# Patient Record
Sex: FEMALE | Race: WHITE
[De-identification: names, ages, dates, MRNs, and addresses within clinical notes are randomized per-mention and may not be internally consistent; named-entity substitution may affect disease eponyms.]

---

## 2017-06-06 ENCOUNTER — HOSPITAL ENCOUNTER (EMERGENCY)
Dept: HOSPITAL 63 - ER | Age: 27
Discharge: ANOTHER HOSPITAL | End: 2017-06-06
Payer: SELF-PAY

## 2017-06-06 ENCOUNTER — HOSPITAL ENCOUNTER (INPATIENT)
Dept: HOSPITAL 61 - 4 NORTH | Age: 27
LOS: 3 days | Discharge: HOME | DRG: 418 | End: 2017-06-09
Attending: INTERNAL MEDICINE | Admitting: INTERNAL MEDICINE
Payer: SELF-PAY

## 2017-06-06 VITALS — SYSTOLIC BLOOD PRESSURE: 121 MMHG | DIASTOLIC BLOOD PRESSURE: 83 MMHG

## 2017-06-06 VITALS — SYSTOLIC BLOOD PRESSURE: 119 MMHG | DIASTOLIC BLOOD PRESSURE: 82 MMHG

## 2017-06-06 VITALS — SYSTOLIC BLOOD PRESSURE: 121 MMHG | DIASTOLIC BLOOD PRESSURE: 71 MMHG

## 2017-06-06 VITALS — BODY MASS INDEX: 40.55 KG/M2 | HEIGHT: 67 IN | WEIGHT: 258.38 LBS

## 2017-06-06 VITALS — DIASTOLIC BLOOD PRESSURE: 81 MMHG | SYSTOLIC BLOOD PRESSURE: 123 MMHG

## 2017-06-06 VITALS — SYSTOLIC BLOOD PRESSURE: 126 MMHG | DIASTOLIC BLOOD PRESSURE: 80 MMHG

## 2017-06-06 VITALS — WEIGHT: 274 LBS | HEIGHT: 66 IN | BODY MASS INDEX: 44.03 KG/M2

## 2017-06-06 VITALS — SYSTOLIC BLOOD PRESSURE: 130 MMHG | DIASTOLIC BLOOD PRESSURE: 84 MMHG

## 2017-06-06 VITALS — DIASTOLIC BLOOD PRESSURE: 68 MMHG | SYSTOLIC BLOOD PRESSURE: 112 MMHG

## 2017-06-06 VITALS — DIASTOLIC BLOOD PRESSURE: 73 MMHG | SYSTOLIC BLOOD PRESSURE: 110 MMHG

## 2017-06-06 VITALS — DIASTOLIC BLOOD PRESSURE: 50 MMHG | SYSTOLIC BLOOD PRESSURE: 123 MMHG

## 2017-06-06 DIAGNOSIS — Z83.3: ICD-10-CM

## 2017-06-06 DIAGNOSIS — K76.0: ICD-10-CM

## 2017-06-06 DIAGNOSIS — E66.01: ICD-10-CM

## 2017-06-06 DIAGNOSIS — K80.00: Primary | ICD-10-CM

## 2017-06-06 DIAGNOSIS — K80.62: Primary | ICD-10-CM

## 2017-06-06 DIAGNOSIS — Z87.891: ICD-10-CM

## 2017-06-06 DIAGNOSIS — Z90.89: ICD-10-CM

## 2017-06-06 DIAGNOSIS — Z82.49: ICD-10-CM

## 2017-06-06 LAB
ALBUMIN SERPL-MCNC: 3.5 G/DL (ref 3.4–5)
ALBUMIN SERPL-MCNC: 3.7 G/DL (ref 3.4–5)
ALBUMIN/GLOB SERPL: 0.9 {RATIO} (ref 1–1.7)
ALBUMIN/GLOB SERPL: 0.9 {RATIO} (ref 1–1.7)
ALP SERPL-CCNC: 63 U/L (ref 46–116)
ALP SERPL-CCNC: 79 U/L (ref 46–116)
ALT SERPL-CCNC: 26 U/L (ref 14–59)
ALT SERPL-CCNC: 31 U/L (ref 14–59)
ANION GAP SERPL CALC-SCNC: 11 MMOL/L (ref 6–14)
ANION GAP SERPL CALC-SCNC: 13 MMOL/L (ref 6–14)
APTT PPP: YELLOW S
AST SERPL-CCNC: 16 U/L (ref 15–37)
AST SERPL-CCNC: 33 U/L (ref 15–37)
BACTERIA #/AREA URNS HPF: (no result) /HPF
BASOPHILS # BLD AUTO: 0 X10^3/UL (ref 0–0.2)
BASOPHILS # BLD AUTO: 0.1 X10^3/UL (ref 0–0.2)
BASOPHILS NFR BLD: 0 % (ref 0–3)
BASOPHILS NFR BLD: 0 % (ref 0–3)
BILIRUB SERPL-MCNC: 0.3 MG/DL (ref 0.2–1)
BILIRUB SERPL-MCNC: 0.4 MG/DL (ref 0.2–1)
BILIRUB UR QL STRIP: (no result)
BUN SERPL-MCNC: 9 MG/DL (ref 7–20)
BUN/CREAT SERPL: 13 (ref 6–20)
BUN/CREAT SERPL: 15 (ref 6–20)
CA-I SERPL ISE-MCNC: 12 MG/DL (ref 7–20)
CALCIUM SERPL-MCNC: 8.7 MG/DL (ref 8.5–10.1)
CALCIUM SERPL-MCNC: 9.1 MG/DL (ref 8.5–10.1)
CHLORIDE SERPL-SCNC: 104 MMOL/L (ref 98–107)
CHLORIDE SERPL-SCNC: 104 MMOL/L (ref 98–107)
CO2 SERPL-SCNC: 24 MMOL/L (ref 21–32)
CO2 SERPL-SCNC: 26 MMOL/L (ref 21–32)
CREAT SERPL-MCNC: 0.7 MG/DL (ref 0.6–1)
CREAT SERPL-MCNC: 0.8 MG/DL (ref 0.6–1)
EOSINOPHIL NFR BLD: 0 % (ref 0–3)
EOSINOPHIL NFR BLD: 0.1 X10^3/UL (ref 0–0.7)
EOSINOPHIL NFR BLD: 1 % (ref 0–3)
ERYTHROCYTE [DISTWIDTH] IN BLOOD BY AUTOMATED COUNT: 12.7 % (ref 11.5–14.5)
ERYTHROCYTE [DISTWIDTH] IN BLOOD BY AUTOMATED COUNT: 12.8 % (ref 11.5–14.5)
FIBRINOGEN PPP-MCNC: CLEAR MG/DL
GFR SERPLBLD BASED ON 1.73 SQ M-ARVRAT: 102.8 ML/MIN
GFR SERPLBLD BASED ON 1.73 SQ M-ARVRAT: 88.1 ML/MIN
GLOBULIN SER-MCNC: 3.8 G/DL (ref 2.2–3.8)
GLOBULIN SER-MCNC: 3.9 G/DL (ref 2.2–3.8)
GLUCOSE SERPL-MCNC: 117 MG/DL (ref 70–99)
GLUCOSE SERPL-MCNC: 121 MG/DL (ref 70–99)
GLUCOSE UR STRIP-MCNC: (no result) MG/DL
HCT VFR BLD CALC: 37.1 % (ref 36–47)
HCT VFR BLD CALC: 40 % (ref 36–47)
HGB BLD-MCNC: 12.7 G/DL (ref 12–15.5)
HGB BLD-MCNC: 13.5 G/DL (ref 12–15.5)
LIPASE: 83 U/L (ref 73–393)
LYMPHOCYTES # BLD: 1.2 X10^3/UL (ref 1–4.8)
LYMPHOCYTES # BLD: 2.3 X10^3/UL (ref 1–4.8)
LYMPHOCYTES NFR BLD AUTO: 10 % (ref 24–48)
LYMPHOCYTES NFR BLD AUTO: 18 % (ref 24–48)
MCH RBC QN AUTO: 29 PG (ref 25–35)
MCH RBC QN AUTO: 29 PG (ref 25–35)
MCHC RBC AUTO-ENTMCNC: 34 G/DL (ref 31–37)
MCHC RBC AUTO-ENTMCNC: 34 G/DL (ref 31–37)
MCV RBC AUTO: 84 FL (ref 79–100)
MCV RBC AUTO: 85 FL (ref 79–100)
MONO #: 1 X10^3/UL (ref 0–1.1)
MONOCYTES NFR BLD: 6 % (ref 0–9)
MONOCYTES NFR BLD: 8 % (ref 0–9)
NEUT #: 9.1 X10^3UL (ref 1.8–7.7)
NEUTROPHILS NFR BLD AUTO: 73 % (ref 31–73)
NEUTROPHILS NFR BLD AUTO: 84 % (ref 31–73)
NITRITE UR QL STRIP: (no result)
PLATELET # BLD AUTO: 163 X10^3/UL (ref 140–400)
PLATELET # BLD AUTO: 173 X10^3/UL (ref 140–400)
POTASSIUM SERPL-SCNC: 3.9 MMOL/L (ref 3.5–5.1)
POTASSIUM SERPL-SCNC: 3.9 MMOL/L (ref 3.5–5.1)
PROT SERPL-MCNC: 7.3 G/DL (ref 6.4–8.2)
PROT SERPL-MCNC: 7.6 G/DL (ref 6.4–8.2)
RBC # BLD AUTO: 4.41 X10^6/UL (ref 3.5–5.4)
RBC # BLD AUTO: 4.71 X10^6/UL (ref 3.5–5.4)
RBC #/AREA URNS HPF: (no result) /HPF (ref 0–2)
SODIUM SERPL-SCNC: 141 MMOL/L (ref 136–145)
SODIUM SERPL-SCNC: 141 MMOL/L (ref 136–145)
SP GR UR STRIP: 1.02
SQUAMOUS #/AREA URNS LPF: (no result) /LPF
UROBILINOGEN UR-MCNC: 0.2 MG/DL
WBC # BLD AUTO: 11.9 X10^3/UL (ref 4–11)
WBC # BLD AUTO: 12.5 X10^3/UL (ref 4–11)
WBC #/AREA URNS HPF: (no result) /HPF (ref 0–4)

## 2017-06-06 PROCEDURE — S0028 INJECTION, FAMOTIDINE, 20 MG: HCPCS

## 2017-06-06 PROCEDURE — 83690 ASSAY OF LIPASE: CPT

## 2017-06-06 PROCEDURE — 96372 THER/PROPH/DIAG INJ SC/IM: CPT

## 2017-06-06 PROCEDURE — 74300 X-RAY BILE DUCTS/PANCREAS: CPT

## 2017-06-06 PROCEDURE — 96361 HYDRATE IV INFUSION ADD-ON: CPT

## 2017-06-06 PROCEDURE — 96375 TX/PRO/DX INJ NEW DRUG ADDON: CPT

## 2017-06-06 PROCEDURE — 96376 TX/PRO/DX INJ SAME DRUG ADON: CPT

## 2017-06-06 PROCEDURE — C1726 CATH, BAL DIL, NON-VASCULAR: HCPCS

## 2017-06-06 PROCEDURE — 76705 ECHO EXAM OF ABDOMEN: CPT

## 2017-06-06 PROCEDURE — 80076 HEPATIC FUNCTION PANEL: CPT

## 2017-06-06 PROCEDURE — 85610 PROTHROMBIN TIME: CPT

## 2017-06-06 PROCEDURE — 74328 X-RAY BILE DUCT ENDOSCOPY: CPT

## 2017-06-06 PROCEDURE — C1769 GUIDE WIRE: HCPCS

## 2017-06-06 PROCEDURE — 85027 COMPLETE CBC AUTOMATED: CPT

## 2017-06-06 PROCEDURE — 81001 URINALYSIS AUTO W/SCOPE: CPT

## 2017-06-06 PROCEDURE — 93005 ELECTROCARDIOGRAM TRACING: CPT

## 2017-06-06 PROCEDURE — 36415 COLL VENOUS BLD VENIPUNCTURE: CPT

## 2017-06-06 PROCEDURE — 80053 COMPREHEN METABOLIC PANEL: CPT

## 2017-06-06 PROCEDURE — 81025 URINE PREGNANCY TEST: CPT

## 2017-06-06 PROCEDURE — C1782 MORCELLATOR: HCPCS

## 2017-06-06 PROCEDURE — 96365 THER/PROPH/DIAG IV INF INIT: CPT

## 2017-06-06 PROCEDURE — 99285 EMERGENCY DEPT VISIT HI MDM: CPT

## 2017-06-06 PROCEDURE — 0FT44ZZ RESECTION OF GALLBLADDER, PERCUTANEOUS ENDOSCOPIC APPROACH: ICD-10-PCS | Performed by: SURGERY

## 2017-06-06 PROCEDURE — BF131ZZ FLUOROSCOPY OF GALLBLADDER AND BILE DUCTS USING LOW OSMOLAR CONTRAST: ICD-10-PCS | Performed by: SURGERY

## 2017-06-06 RX ADMIN — BACITRACIN SCH MLS/HR: 5000 INJECTION, POWDER, FOR SOLUTION INTRAMUSCULAR at 09:17

## 2017-06-06 RX ADMIN — BACITRACIN SCH MLS/HR: 5000 INJECTION, POWDER, FOR SOLUTION INTRAMUSCULAR at 18:20

## 2017-06-06 RX ADMIN — MORPHINE SULFATE PRN MG: 2 INJECTION, SOLUTION INTRAMUSCULAR; INTRAVENOUS at 10:54

## 2017-06-06 RX ADMIN — MORPHINE SULFATE PRN MG: 2 INJECTION, SOLUTION INTRAMUSCULAR; INTRAVENOUS at 09:16

## 2017-06-06 RX ADMIN — MORPHINE SULFATE PRN MG: 2 INJECTION, SOLUTION INTRAMUSCULAR; INTRAVENOUS at 16:14

## 2017-06-06 RX ADMIN — OXYCODONE HYDROCHLORIDE AND ACETAMINOPHEN PRN TAB: 5; 325 TABLET ORAL at 19:43

## 2017-06-06 NOTE — PDOC2
LENORE CONLEY APRN 6/6/17 0852:


CONSULT


Date of Consult


Date of Consult


DATE: 6/6/17 


TIME: 08:46





Reason for Consult


Reason for Consult:


cholelithiasis





Referring Physician


Referring Physician:


[facility] ER





Identification/Chief Complaint


Chief Complaint


abdominal pain





Source


Source:  Chart review, Patient





History of Present Illness


Reason for Visit:


Admitted wtih epigastric and RUQ pain for 3 days.  Last night developed 

vomiting.  Denies aggravated by eating, only relieving factor is pain 

medication.  Denies similar pain in past.  Pain is constant and cramping in 

nature 


No constipation or diarrhea, denies NSAID use





Past Medical History


Past Medical History


denies any pertinent hx





Past Surgical History


Past Surgical History:  Tonsillectomy





Family History


Family History:  Other (noncontributory to current illness )





Social History


Social History


stay at home mom


No


ALCOHOL:  none


Drugs:  None


Lives:  with Family





Current Medications


Current Medications





Current Medications


Sodium Chloride 1,000 ml @  100 mls/hr Q10H IV ;  Start 6/6/17 at 08:45


Morphine Sulfate 2 mg PRN Q2HR  PRN IV PAIN;  Start 6/6/17 at 08:45


Ondansetron HCl (Zofran) 4 mg PRN Q6HRS  PRN IV NAUSEA/VOMITING;  Start 6/6/17 

at 08:45





Allergies


Allergies:  


Coded Allergies:  


     No Known Drug Allergies (Unverified , 6/6/17)





ROS


General:  No: Chills, Other (fevers )


PSYCHOLOGICAL ROS:  No: Anxiety, Depression


Eyes:  No Blurry vision, No Double vision


HEENT:  No: Heacaches, Sore Throat


Hematological and Lymphatic:  No: Bleeding Problems, Blood Clots


Respiratory:  No: Cough, SOB with excertion


Cardiovascular:  No Chest Pain, No Palpitations


Gastrointestinal:  Yes Other (see hpi)


Genitourinary:  No Dysuria, No Hematuria


Musculoskeletal:  Yes Other (+ radiation of pain to back ), 


   No Joint Pain, No Muscle Pain


Neurological:  No Confusion, No Numbness/Tingling


Skin:  No Mottling, No Pruritus





Physical Exam


General:  Alert, Oriented X3, Cooperative, No acute distress


HEENT:  PERRLA, Mucous membr. moist/pink


Lungs:  Clear to auscultation, Normal air movement


Heart:  Regular rate, Normal S1, Normal S2, No murmurs


Abdomen:  Soft, Other (ND, obese abdomen, greatest tenderness to epigastric, 

however also tender to RUQ, LUQ)


Extremities:  No clubbing, No cyanosis


Skin:  No breakdown, No significant lesion


Neuro:  Normal speech, Sensation intact


Psych/Mental Status:  Mental status NL, Mood NL


MUSCULOSKELETAL:  No deformity, No swelling





Assessment/Plan


Assessment/Plan


US reviewed from [facility]--symptomatic cholelithiasis with nonmobile stone in neck 

of GB, dilated CBD and thickened gallbladder wall


morbid obesity with BMI 40.5





will plan lap ritu


NPO since 4 am





ELVIS PORTILLO MD 6/6/17 1211:


CONSULT


Allergies


Allergies:  


Coded Allergies:  


     No Known Drug Allergies (Unverified , 6/6/17)





Assessment/Plan


Assessment/Plan


Pt seen and examined independently by myself;  26 year old female who reported 

to ER with acute onset of RUQ pain, vomiting.  Pt has never had prior attacks.  

ER evaluation included a sonogram consistent with cholecystitis.  PMH/PSH/ROS/

SH as above;  exam: alert, oriented, drowsy from pain meds,  no scleral icterus

, neck supple, heart RR and R, lungs clear, abdomen morbidly obese, soft,  

tender RUQ with guarding,  no masses, ext neg for edema or deformity,  neuro 

motor function grossly intact all 4 extremities.  Labs and sono reviewed;  A/P)

  Calculous cholecystitis,  ?bile duct dilation,  recommend lap ritu with 

cholangiogram.  I discussed the details and risks of surgery with the patient.  

She understands and would like to proceed.











LENORE CONLEY Jun 6, 2017 08:52


ELVIS PORTILLO MD Jun 6, 2017 12:11

## 2017-06-06 NOTE — ACF
Admission Criteria Forms


GALLBLADDER OR BILE DUCT INFLAMMATION OR STONE


 


Clinical Indications for Admission to Inpatient Care


                                                                              (

Place 'X' for any and all applicable criteria):


 


Admission is indicated for patients with ANY ONE of the following(1)(2)(3)(4)(5)

: 


[]I.     Acute cholecystitis as indicated by ALL of the following: 


          [ ]a)   Right upper quadrant pain, mass, or tenderness


          [ ]b)   Systemic signs of inflammation indicated by ANY ONE of the 

following:


                   [ ]i)    Fever


                   [ ]ii)   C-reactive protein level greater than 10 mg/L (95 

nmol/L)


                   [ ]iii)  White blood cell count greater than 10,000/mm3 (10 

x109/L) or less than 4000/mm3 (4 x109/L)


[ ]II.    Inpatient admission required rather than observation care (Also use 

Gallbladder or Bile Duct Inflammation or Stone: 


         Observation Care as appropriate) because of  ANY ONE  of the following:


          [ ]a)   Common bile duct obstruction diagnosed    


          [ ]b)   Vomiting that is severe or persistent            


          [ ]c)   Severe pain requiring acute inpatient management 


          [ ]d)   Signs of intestinal obstruction or peritonitis [A]


          [ ]e)   Severe electrolyte abnormalities requiring inpatient care 


          [ ]f)    Absent bowel sounds with complete ileus(8)


          [ ]g)   Hemodynamic instability


          [ ]h)   High fever or infection requiring inpatient admission as 

indicated by ANY ONE of the following (9): 


                  [ ]1)   Appropriate outpatient or observation care 

antimicrobial Treatment. unavailable, not effective, or not feasible       


                  [ ]2)   Temperature greater than 104.9 degrees F (40.5 

degrees C) (oral) 


                  [ ]3)   Temperature greater than 103.1 degrees F (39.5 

degrees C) (oral) or less than 96.8 degrees F (36 degrees C) 


                           (rectal) that does not respond to all emergency 

treatment measures


                  [ ]4)   Documented bacteremia 


          [ ]i)   IV fluid to replace significant ongoing losses (greater than 

3 L/m2 per day) 


          [ ]j)   Percutaneous or open drainage (eg, abscess, biliary tract) 

procedures  


          [ ]k)  Immediate inpatient surgery 


          [ ]l)   Other condition, treatment or monitoring requiring inpatient 

admission


[ ]III.  Acute cholangitis as indicated by ALL of the following(9)(10): 


          [ ]a) Systemic signs of inflammation indicated by ANY ONE of the 

following:


                 [ ]i)    Fever


                 [ ]ii)   C-reactive protein level greater than 10 mg/L (95 nmol

/L)


                 [ ]iii)  White blood cell count greater than 10,000/mm3 (10 

x109/L) or less than 4000/mm3 (4 x109/L)


          [ ]b) Evidence of common bile duct disease indicated by ANY ONE of 

the following:


                 [ ]i)   Total serum bilirubin level greater than or equal to 2 

mg/dL (34 micromoles/L)


                 [ ]ii)   Liver function test (alkaline phosphatase (ALP), r-

glutamyltransferase (GGT), aspartate aminotransferase (AST), 


                         or alanine aminotransferase (ALT)) greater than 1.5 

times the upper limit of normal[B]


                 [ ]iii)  Hepatobiliary imaging showing biliary dilatation or 

evidence of etiology (eg, stricture, stone, previously placed stent)











Extended stay beyond goal length of stay may be needed for (1)(2)): 


[ ]a)   Bacteremia or Hemodynamic instability


[ ]b)   Cholecystectomy 


[ ]c)   Other surgical procedure(24)


[ ]d)   Percutaneous or endoscopic ultrasound-guided cholecystostomy


 


                                             


The original [facility] [employer] content created by [facility] 

[employer] has been revised. 


The portions of the content which have been revised are identified through the 

use of italic text or in bold, and  [facility] 


has neither reviewed nor approved the modified material. All other unmodified 

content is copyright  [facility].





Please see references footnoted in the original [facility] edition 

2016














ALLYN MORALES Jun 6, 2017 05:55

## 2017-06-06 NOTE — PDOC2
GI CONSULT


Reason For Consult:


Small CBD filling defect





HPI:


HPI:


25 y/o female who underwent cholecystectomy this afternoon w/ Dr. Boyle for 

cholelithiasis and cholecystitis.  IOC showed possible small distal common bile 

duct calculus, hence GI consult.  She is seen in her regular room just back 

from PACU, still drowsy.  She has some post-op type discomfort that is 

different from the epigastric and BUQ pain w/ radiation to her back that 

originally brought her to [facility] ER.  Had this pain intermittently x 2 weeks, 

associated w/ n/v.  Denies reflux/heartburn, diarrhea, constipation.





Abd US at [facility] showed cholelithiasis and dilated CBD at 1.2cm w/ thickened GB 

and fatty liver.  LFTs have been normal.





PMH:


PMH:


cholecystectomy, tonsillectomy





FH:


Family History:  No pertinent hx (no GI cancers)





Social History:


Smoke:  Quit


ALCOHOL:  none


Drugs:  None





ROS:





GEN: Denies fevers, chills, sweats


HEENT: Denies blurred vision, sore throat


CV: Denies chest pain


RESP: Denies shortness of air, cough


GI: Per HPI


: Denies hematuria, dysuria


ENDO: Denies weight changes


NEURO: Denies confusion, dizziness


MSK: Denies weakness, joint pain/swelling


SKIN: Denies jaundice, pruritus





Vitals:


Vitals:





 Vital Signs








  Date Time  Temp Pulse Resp B/P (MAP) Pulse Ox O2 Delivery O2 Flow Rate FiO2


 


6/6/17 14:57 98.4 72 17 134/73 94 Room Air  





 98.4       


 


6/6/17 14:27       8 











Labs:


Labs:





Laboratory Tests








Test


  6/6/17


09:10


 


White Blood Count


  11.9 x10^3/uL


(4.0-11.0)


 


Red Blood Count


  4.41 x10^6/uL


(3.50-5.40)


 


Hemoglobin


  12.7 g/dL


(12.0-15.5)


 


Hematocrit


  37.1 %


(36.0-47.0)


 


Mean Corpuscular Volume 84 fL () 


 


Mean Corpuscular Hemoglobin 29 pg (25-35) 


 


Mean Corpuscular Hemoglobin


Concent 34 g/dL


(31-37)


 


Red Cell Distribution Width


  12.7 %


(11.5-14.5)


 


Platelet Count


  163 x10^3/uL


(140-400)


 


Neutrophils (%) (Auto) 84 % (31-73) 


 


Lymphocytes (%) (Auto) 10 % (24-48) 


 


Monocytes (%) (Auto) 6 % (0-9) 


 


Eosinophils (%) (Auto) 0 % (0-3) 


 


Basophils (%) (Auto) 0 % (0-3) 


 


Neutrophils # (Auto)


  10.0 x10^3uL


(1.8-7.7)


 


Lymphocytes # (Auto)


  1.2 x10^3/uL


(1.0-4.8)


 


Monocytes # (Auto)


  0.7 x10^3/uL


(0.0-1.1)


 


Eosinophils # (Auto)


  0.0 x10^3/uL


(0.0-0.7)


 


Basophils # (Auto)


  0.0 x10^3/uL


(0.0-0.2)


 


Sodium Level


  141 mmol/L


(136-145)


 


Potassium Level


  3.9 mmol/L


(3.5-5.1)


 


Chloride Level


  104 mmol/L


()


 


Carbon Dioxide Level


  24 mmol/L


(21-32)


 


Anion Gap 13 (6-14) 


 


Blood Urea Nitrogen 9 mg/dL (7-20) 


 


Creatinine


  0.7 mg/dL


(0.6-1.0)


 


Estimated GFR


(Cockcroft-Gault) 102.8 


 


 


BUN/Creatinine Ratio 13 (6-20) 


 


Glucose Level


  121 mg/dL


(70-99)


 


Calcium Level


  8.7 mg/dL


(8.5-10.1)


 


Total Bilirubin


  0.4 mg/dL


(0.2-1.0)


 


Aspartate Amino Transf


(AST/SGOT) 33 U/L (15-37) 


 


 


Alanine Aminotransferase


(ALT/SGPT) 31 U/L (14-59) 


 


 


Alkaline Phosphatase


  63 U/L


()


 


Total Protein


  7.3 g/dL


(6.4-8.2)


 


Albumin


  3.5 g/dL


(3.4-5.0)


 


Albumin/Globulin Ratio 0.9 (1.0-1.7) 











Allergies:


Coded Allergies:  


     No Known Drug Allergies (Unverified , 6/6/17)





Medications:





Current Medications








 Medications


  (Trade)  Dose


 Ordered  Sig/Vibha


 Route


 PRN Reason  Start Time


 Stop Time Status Last Admin


Dose Admin


 


 Sodium Chloride  1,000 ml @ 


 100 mls/hr  Q10H


 IV


   6/6/17 08:45


    6/6/17 09:17


 


 


 Morphine Sulfate  2 mg  PRN Q2HR  PRN


 IV


 PAIN  6/6/17 08:45


    6/6/17 10:54


 


 


 Cefazolin Sodium/


 Dextrose  50 ml @ 


 100 mls/hr  1X PREOP  PRN


 IV


 on call to OR   6/6/17 11:15


    6/6/17 12:40


 


 


 Morphine Sulfate  1 mg  PRN Q10MIN  PRN


 IV


 SEVERE PAIN  6/6/17 12:00


 6/7/17 11:59  6/6/17 14:41


 


 


 Ringer's Solution  1,000 ml @ 


 0 mls/hr  Q0M


 IV


   6/6/17 11:57


 6/6/17 23:56  6/6/17 12:26


 


 


 Bupivacaine HCl/


 Epinephrine Bitart


  (Sensorcain-Mpf


 Epi 0.5%-1:286142)  30 ml  STK-MED ONCE


 .ROUTE


   6/6/17 12:05


 6/6/17 12:06 DC 6/6/17 12:51


 


 


 Iohexol


  (Omnipaque 300


 Mg/ml)  50 ml  STK-MED ONCE


 .ROUTE


   6/6/17 12:05


 6/6/17 12:06 DC 6/6/17 12:51


 











Imaging:


Imaging:


IOC 6/6/17


IMPRESSION: Possible small distal common bile duct calculus.





PE:





GEN: NAD


HEENT: Atraumatic, PERRL


LUNGS: CTAB anteriorly


HEART: RRR


ABD: BS+, soft, tender


EXTREMITY: No edema


SKIN: No rashes, no jaundice


NEURO/PSYCH: A & O 3, drowsy





A/P:


A/P:


S/p cholecystectomy for cholelithiasis, cholecystitis


Abnormal IOC


Upper abd and back pain, n/v





--


Possible CBD stone w/ normal LFTs.


Will review w/ Dr. Rice ?plans for ERCP.











SARA MORALES Jun 6, 2017 15:47

## 2017-06-06 NOTE — RAD
Intraoperative cholangiogram, 6/6/2017:



History: Cholecystectomy



5 spot films from surgery are presented for review. Contrast has been injected

into the cystic duct remnant. 40 seconds of fluoroscopy time was utilized.



There is good flow of contrast into the duodenum at the ampulla. The common

duct appears to be mildly enlarged. There is a small lucency in the distal

common bile duct near the ampulla which persists on all these images. The

appearance suggests a tiny common duct calculus versus an air bubble. The

incompletely opacified intrahepatic bile ducts are unremarkable. No contrast

extravasation is evident.



IMPRESSION: Possible small distal common bile duct calculus.

## 2017-06-06 NOTE — PDOC4
Operative Note


Operative Note


Operative Note:





Preoperative Diagnosis: Acute cholecystitis 





Postoperative Diagnosis: Same





Procedure: Laparoscopic cholecystectomy with intraoperative cholangiogram





Surgeons: [place]





Assistant: Molly ALEXANDRA





Anesthesia: Gen.





Estimated Blood Loss: 20 mL





Specimen: Gallbladder to pathology





Drains: None





Complications: None





Indications: The patient is a 26-year-old female who reported to the emergency 

department with upper abdominal and right upper quadrant pain. Her evaluation 

was consistent with acute cholecystitis.   Surgical treatment was offered by 

means of a laparoscopic cholecystectomy.  The risks of surgery were discussed 

which include bleeding, infection, bile duct injury, bile leak, pain, the 

potential for additional surgeries or procedures.  The patient understands and 

would like to proceed.





Description:  The patient was taken to the operating room and laid supine on 

the operating table.  General anesthesia was performed.  The abdomen was 

prepped with ChloraPrep and draped in a standard surgical fashion.  A small 

infraumbilical incision was made with a scalpel.  The Veress needle was then 

inserted and a pneumoperitoneum was then created.  A  5 mm trocar was then 

inserted and the laparoscope was introduced.  In the upper midabdomen a 5 mm 

trocar was inserted and in the right upper quadrant two 2.3 mm mini lap 

graspers were inserted.  The gallbladder was markedly distended and tense. 

Using an aspirating needle approximately 50 mL of yellowish fluid was aspirated 

providing decompression of the gallbladder. The gallbladder was then retracted 

cephalad.  We began dissecting along the inferior aspect of the gallbladder. 

There was significant inflammatory change and fibrosis present. The cystic duct 

was identified and dissected free from surrounding tissues.  The cystic duct 

appeared fairly dilated and moderate sized stones were present in the duct. I 

was able to milk the stones back into the gallbladder. The upper abdominal 5 mm 

trocar was exchanged with a 12 mm trocar. One arch clip was placed on the 

cystic duct near the gallbladder junction.  An opening was made in the duct and 

a cholangiocatheter placed within and secured with a clip.  Using contrast dye 

and fluoroscopy an intraoperative cholangiogram was performed. The common bile 

duct appeared mildly dilated however contrast readily passed into the duodenum. 

There was a very small lucency in the distal common duct consistent with either 

a very small stone versus air bubble. The clip and catheter were then 

withdrawn.  Three clips were placed on the cystic duct and it was divided.  The 

cystic artery was then identified, dissected free, doubly clipped and divided 

as well.  The gallbladder was then mobilized away from the liver with cautery. 

The gallbladder was then placed in an endoscopic bag and extracted at the 

superior abdominal trocar site.  The fascia there was closed with 0 Vicryl 

sutures, and the fascial closure was injected with half percent Marcaine with 

epinephrine.  All blood and irrigation fluid was suctioned and hemostasis was 

good.  The remaining ports were removed and the pneumoperitoneum was relieved.  

The skin incisions were closed using 4-0 Monocryl suture.  Steri-Strips and 

dressings were then applied.  The patient tolerated the procedure well and was 

sent to the recovery room in stable condition.  At the end of the case all 

counts were correct.











ELVIS PORTILLO MD Jun 6, 2017 13:51

## 2017-06-06 NOTE — RAD
Abdominal ultrasound right upper quadrant:

 

Reason for examination: Right upper quadrant abdominal pain. Large body 

habitus.

 

The pancreas is poorly visualized due to bowel gas. No abnormality is seen

at the visualized portion of the inferior vena cava. The liver shows fatty

infiltration without a focal lesion and appears to be enlarged at 18.8 cm.

Gallbladder shows presence of cholelithiasis with nonmobile gallstone in 

the region of the gallbladder neck. Gallbladder wall is thickened at 4.9 

mm. Common bile duct is distended at 1.2 cm. There was a positive Romero 

sign during ultrasound examination. The right kidney measures 11.3 x 4.5 x

3.8 cm in greatest dimension with no mass or hydronephrosis.

 

IMPRESSION:

 

Cholelithiasis with a nonmobile gallstone at the gallbladder neck with 

thickened gallbladder wall and dilated common bile duct at 1.2 cm. 

Enlarged liver with fatty infiltration.

 

Electronically signed by: Rosa Isela Mahoney MD (6/6/2017 5:36 AM)

## 2017-06-06 NOTE — EKG
[facility] [address], [place] [address]

Test Date:    2017               Test Time:    04:09:22

Pat Name:     VIC ERVIN            Department:   

Patient ID:   [id number]           Room:          

Gender:       F                        Technician:   RILEY

:          1990               Requested By: SHILOH HAHN

Order Number: [id number]            Reading MD:   Jeison Minor

                                 Measurements

Intervals                              Axis          

Rate:         88                       P:            36

MD:           166                      QRS:          17

QRSD:         64                       T:            17

QT:           338                                    

QTc:          412                                    

                           Interpretive Statements

SINUS RHYTHM

NON SPECIFIC T ABNORMALITY

RI6.01          Unconfirmed report

No previous ECG available for comparison



Electronically Signed On 2017 9:57:03 CDT by Jeison Minor

## 2017-06-06 NOTE — PHYS DOC
Adult General


HPI


HPI





Patient is a 26 year old F who presents with right upper quadrant and 

epigastric pain radiating to her chest with nausea and vomiting that started at 

9 PM. Patient has no cardiac history. Patient has no abdominal surgery history. 

Patient currently takes no medications however is obese. Patient states about 9 

PM tonight she started developing epigastric pain is right upper quadrant pain 

to the point where she'll call her  home from work to take for the 

emergency room. Patient denies any shortness of breath. Patient denies any 

dysuria or diarrhea. Patient denies any fevers. Patient has no other complaints.





Pertinent exam findings:


Positive tender to palpation right upper quadrant and epigastric with rebound 

tenderness


Heart was regular rhythm without murmurs





ED course:


Patient was seen and evaluated a CBC, CMP, lipase, UA, urine pregnant, 

ultrasound the gallbladder were ordered along with IV fluids and Reglan 10 mg 

IV and 50 mg of fentanyl


0526: Discuss lab results and ultrasound results with the patient and agreed to 

admit to surgery at [facility]


0532: Discussed CC/HP/PMH with Dr. Ruiz and recommends admit to the 

hospitalist and have a GI consult, Dr. Moise on for GI, wants to start Zosyn


0537: Discussed CC/HP/PMH with Dr. Torres and recommends admit 


0545: Patient was redosed with 50 g of fentanyl and there are to recurring 

right upper quadrant pain []





Pertinent results:


0408: EKG shows normal sinus rhythm rate of 88 no STEMI


0520: Discuss ultrasound report with ultrasound tech who confirmed enlargement 

of the common bile duct, GB wall thickening, multiple gallstones consistent 

with acute cholecystitis


Liver and pancreatic enzymes unremarkable


WBC 12





MDM:


After reviewing the chart, CC/HPI/PMH, physical exam, [lab results], [

radiological results], believe the patient has acute cholecystitis and will be 

transferred to [facility] for surgical evaluation and management. 

Patient will be admitted to the hospitalist with surgery and GI consult





Review of Systems


Review of Systems


GEN: Denies fevers, chills, sweats


HEENT: Denies blurred vision, sore throat


CV: Denies chest pain


RESP: Denies shortness of air, cough


GI: Epigastric and right upper quadrant abdominal pain


NEURO: Denies confusion, dizziness


MSK: Denies weakness, joint pain/swelling





Current Medications


Current Medications





Current Medications








 Medications


  (Trade)  Dose


 Ordered  Sig/James  Start Time


 Stop Time Status Last Admin


Dose Admin


 


 Ondansetron HCl


  (Zofran Odt)  4 mg  STK-MED ONCE  6/6/17 03:56


 6/6/17 03:57 DC  


 











Physical Exam


Physical Exam


GEN.:    No apparent distress.  Alert and oriented.


HEENT:    Head is normocephalic, atraumatic


NECK:    Supple.  


LUNGS:    CTAB.


HEART:    RRR, S1, S2 present.  Peripheral pulses intact


ABDOMEN:    Soft, positive tenderness to palpation in the right upper quadrant 

and epigastric without rebound tenderness.  Positive bowel sounds.


EXTREMITIES:    Without any cyanosis.    


NEUROLOGIC:     Normal speech, normal tone


PSYCHIATRIC:    Normal affect, normal mood.


SKIN:   No ulcerations





EKG


EKG


EKG shows normal sinus rhythm rate of 88 no STEMI []





Radiology/Procedures


Radiology/Procedures


Abdominal Ultrasound limited 


Multiple gallstones, gallbladder wall 5 mm, common bile duct 12 mm 


IMPRESSION:


 Cholelithiasis with a nonmobile gallstone at the gallbladder neck with 


thickened gallbladder wall and dilated common bile duct at 1.2 cm. 


Enlarged liver with fatty infiltration.[]





Course & Med Decision Making


Course & Med Decision Making


Pertinent Labs and Imaging studies reviewed. (See chart for details)





[]





Dragon Disclaimer


Dragon Disclaimer


This chart was dictated in whole or in part using Voice Recognition software in 

a busy, high-work load, and often noisy Emergency Department environment.  It 

may contain unintended and wholly unrecognized errors or omissions.





Departure


Departure:


Impression:  


 Primary Impression:  


 Acute cholecystitis


 Additional Impressions:  


 Right upper quadrant abdominal pain


 Nausea and vomiting


Disposition:  02 XFER [facility] ([facility]


Admitting Physician:  Other (Dr. Torres)


Condition:  STABLE





Problem Qualifiers








 Additional Impressions:  


 Nausea and vomiting


 Vomiting type:  bilious vomiting  Qualified Codes:  R11.14 - Bilious vomiting








SHILOH HAHN DO Jun 6, 2017 04:16

## 2017-06-07 VITALS — SYSTOLIC BLOOD PRESSURE: 108 MMHG | DIASTOLIC BLOOD PRESSURE: 69 MMHG

## 2017-06-07 VITALS — SYSTOLIC BLOOD PRESSURE: 115 MMHG | DIASTOLIC BLOOD PRESSURE: 70 MMHG

## 2017-06-07 VITALS — SYSTOLIC BLOOD PRESSURE: 116 MMHG | DIASTOLIC BLOOD PRESSURE: 75 MMHG

## 2017-06-07 VITALS — SYSTOLIC BLOOD PRESSURE: 112 MMHG | DIASTOLIC BLOOD PRESSURE: 75 MMHG

## 2017-06-07 VITALS — DIASTOLIC BLOOD PRESSURE: 63 MMHG | SYSTOLIC BLOOD PRESSURE: 104 MMHG

## 2017-06-07 VITALS — DIASTOLIC BLOOD PRESSURE: 62 MMHG | SYSTOLIC BLOOD PRESSURE: 106 MMHG

## 2017-06-07 LAB
ALBUMIN SERPL-MCNC: 3.1 G/DL (ref 3.4–5)
ALP SERPL-CCNC: 51 U/L (ref 46–116)
ALT SERPL-CCNC: 41 U/L (ref 14–59)
AST SERPL-CCNC: 30 U/L (ref 15–37)
BILIRUB DIRECT SERPL-MCNC: 0.2 MG/DL (ref 0–0.2)
BILIRUB SERPL-MCNC: 0.6 MG/DL (ref 0.2–1)
INR PPP: 1.1 (ref 0.8–1.1)
PROT SERPL-MCNC: 6.7 G/DL (ref 6.4–8.2)
PROTHROMBIN TIME: 13.2 SEC (ref 11.7–14)

## 2017-06-07 PROCEDURE — 0F798ZZ DILATION OF COMMON BILE DUCT, VIA NATURAL OR ARTIFICIAL OPENING ENDOSCOPIC: ICD-10-PCS | Performed by: SURGERY

## 2017-06-07 RX ADMIN — BACITRACIN SCH MLS/HR: 5000 INJECTION, POWDER, FOR SOLUTION INTRAMUSCULAR at 14:45

## 2017-06-07 RX ADMIN — PANTOPRAZOLE SODIUM SCH MG: 40 TABLET, DELAYED RELEASE ORAL at 18:00

## 2017-06-07 RX ADMIN — BACITRACIN SCH MLS/HR: 5000 INJECTION, POWDER, FOR SOLUTION INTRAMUSCULAR at 04:45

## 2017-06-07 RX ADMIN — OXYCODONE HYDROCHLORIDE AND ACETAMINOPHEN PRN TAB: 5; 325 TABLET ORAL at 18:00

## 2017-06-07 RX ADMIN — OXYCODONE HYDROCHLORIDE AND ACETAMINOPHEN PRN TAB: 5; 325 TABLET ORAL at 07:39

## 2017-06-07 RX ADMIN — OXYCODONE HYDROCHLORIDE AND ACETAMINOPHEN PRN TAB: 5; 325 TABLET ORAL at 11:45

## 2017-06-07 RX ADMIN — MORPHINE SULFATE PRN MG: 2 INJECTION, SOLUTION INTRAMUSCULAR; INTRAVENOUS at 20:43

## 2017-06-07 NOTE — PDOC
Subjective:


Subjective:


Feeling better, still tired.  Incisional pain.  Tolerated PO last night and 

this morning.





Objective:


Vital Signs:





 Vital Signs








  Date Time  Temp Pulse Resp B/P (MAP) Pulse Ox O2 Delivery O2 Flow Rate FiO2


 


6/7/17 07:00 97.7 97 14 112/75 (87) 94 Room Air  





 97.7       


 


6/6/17 14:27       8 








Labs:





Laboratory Tests








Test


  6/7/17


04:55


 


Total Bilirubin 0.6 mg/dL 


 


Direct Bilirubin 0.2 mg/dL 


 


Aspartate Amino Transf


(AST/SGOT) 30 U/L 


 


 


Alanine Aminotransferase


(ALT/SGPT) 41 U/L 


 


 


Alkaline Phosphatase 51 U/L 


 


Total Protein 6.7 g/dL 


 


Albumin 3.1 g/dL 











PE:





GEN: NAD


LUNGS: CTAB


HEART: RRR


ABD: obese, incisional tenderness (mild)


NEURO/PSYCH: A & O 3, more awake today





A/P:


S/p cholecystectomy for cholelithiasis, cholecystitis


Abnormal IOC





--


LFTs still normal, pain better.


Other per Dr. Rice re: ?plans for ERCP tomorrow.  D/w RN.











SARA MORALES Jun 7, 2017 09:46

## 2017-06-07 NOTE — PDOC
PROGRESS NOTES


Subjective


Subjective


doing better, no problems





Objective


Objective





Vital Signs








  Date Time  Temp Pulse Resp B/P (MAP) Pulse Ox O2 Delivery O2 Flow Rate FiO2


 


6/7/17 07:00 97.7 97 14 112/75 (87) 94 Room Air  





 97.7       


 


6/6/17 14:27       8 














Intake and Output 


 


 6/7/17





 07:00


 


Intake Total 0 ml


 


Output Total 2220 ml


 


Balance -2220 ml


 


 


 


Intake Oral 0 ml


 


Output Urine Total 2200 ml


 


Estimated Blood Loss 20 ml


 


# Voids 4











Physical Exam


Physical Exam


abdomen soft





Plan


Plan of Care


LFTs look good,  ok to discharge,  pain script in chart;  FU with me in 2 weeks 

in office





Comment


Review of Relevant


I have reviewed the following items sharifa (where applicable) has been applied.


Labs





Laboratory Tests








Test


  6/6/17


09:10 6/7/17


04:55


 


White Blood Count


  11.9 x10^3/uL


(4.0-11.0) 


 


 


Red Blood Count


  4.41 x10^6/uL


(3.50-5.40) 


 


 


Hemoglobin


  12.7 g/dL


(12.0-15.5) 


 


 


Hematocrit


  37.1 %


(36.0-47.0) 


 


 


Mean Corpuscular Volume 84 fL ()  


 


Mean Corpuscular Hemoglobin 29 pg (25-35)  


 


Mean Corpuscular Hemoglobin


Concent 34 g/dL


(31-37) 


 


 


Red Cell Distribution Width


  12.7 %


(11.5-14.5) 


 


 


Platelet Count


  163 x10^3/uL


(140-400) 


 


 


Neutrophils (%) (Auto) 84 % (31-73)  


 


Lymphocytes (%) (Auto) 10 % (24-48)  


 


Monocytes (%) (Auto) 6 % (0-9)  


 


Eosinophils (%) (Auto) 0 % (0-3)  


 


Basophils (%) (Auto) 0 % (0-3)  


 


Neutrophils # (Auto)


  10.0 x10^3uL


(1.8-7.7) 


 


 


Lymphocytes # (Auto)


  1.2 x10^3/uL


(1.0-4.8) 


 


 


Monocytes # (Auto)


  0.7 x10^3/uL


(0.0-1.1) 


 


 


Eosinophils # (Auto)


  0.0 x10^3/uL


(0.0-0.7) 


 


 


Basophils # (Auto)


  0.0 x10^3/uL


(0.0-0.2) 


 


 


Sodium Level


  141 mmol/L


(136-145) 


 


 


Potassium Level


  3.9 mmol/L


(3.5-5.1) 


 


 


Chloride Level


  104 mmol/L


() 


 


 


Carbon Dioxide Level


  24 mmol/L


(21-32) 


 


 


Anion Gap 13 (6-14)  


 


Blood Urea Nitrogen 9 mg/dL (7-20)  


 


Creatinine


  0.7 mg/dL


(0.6-1.0) 


 


 


Estimated GFR


(Cockcroft-Gault) 102.8 


  


 


 


BUN/Creatinine Ratio 13 (6-20)  


 


Glucose Level


  121 mg/dL


(70-99) 


 


 


Calcium Level


  8.7 mg/dL


(8.5-10.1) 


 


 


Total Bilirubin


  0.4 mg/dL


(0.2-1.0) 0.6 mg/dL


(0.2-1.0)


 


Aspartate Amino Transf


(AST/SGOT) 33 U/L (15-37) 


  30 U/L (15-37) 


 


 


Alanine Aminotransferase


(ALT/SGPT) 31 U/L (14-59) 


  41 U/L (14-59) 


 


 


Alkaline Phosphatase


  63 U/L


() 51 U/L


()


 


Total Protein


  7.3 g/dL


(6.4-8.2) 6.7 g/dL


(6.4-8.2)


 


Albumin


  3.5 g/dL


(3.4-5.0) 3.1 g/dL


(3.4-5.0)


 


Albumin/Globulin Ratio 0.9 (1.0-1.7)  


 


Direct Bilirubin


  


  0.2 mg/dL


(0.0-0.2)








Laboratory Tests








Test


  6/7/17


04:55


 


Total Bilirubin


  0.6 mg/dL


(0.2-1.0)


 


Direct Bilirubin


  0.2 mg/dL


(0.0-0.2)


 


Aspartate Amino Transf


(AST/SGOT) 30 U/L (15-37) 


 


 


Alanine Aminotransferase


(ALT/SGPT) 41 U/L (14-59) 


 


 


Alkaline Phosphatase


  51 U/L


()


 


Total Protein


  6.7 g/dL


(6.4-8.2)


 


Albumin


  3.1 g/dL


(3.4-5.0)








Medications





Current Medications


Sodium Chloride 1,000 ml @  100 mls/hr Q10H IV  Last administered on 6/6/17at 18

:20;  Start 6/6/17 at 08:45


Morphine Sulfate 2 mg PRN Q2HR  PRN IV PAIN Last administered on 6/6/17at 16:14

;  Start 6/6/17 at 08:45


Ondansetron HCl (Zofran) 4 mg PRN Q6HRS  PRN IV NAUSEA/VOMITING;  Start 6/6/17 

at 08:45


Cefazolin Sodium/ Dextrose 50 ml @  100 mls/hr 1X PREOP  PRN IV on call to OR  

Last administered on 6/6/17at 12:40;  Start 6/6/17 at 11:15


Ondansetron HCl (Zofran) 4 mg PRN Q6HRS  PRN IV NAUSEA/VOMITING;  Start 6/6/17 

at 12:00;  Stop 6/7/17 at 11:59


Fentanyl Citrate (Fentanyl 2ml Vial) 25 mcg PRN Q5MIN  PRN IV MILD PAIN;  Start 

6/6/17 at 12:00;  Stop 6/7/17 at 11:59


Fentanyl Citrate (Fentanyl 2ml Vial) 50 mcg PRN Q5MIN  PRN IV MODERATE PAIN;  

Start 6/6/17 at 12:00;  Stop 6/7/17 at 11:59


Morphine Sulfate 1 mg PRN Q10MIN  PRN IV SEVERE PAIN Last administered on 6/6/ 17at 14:41;  Start 6/6/17 at 12:00;  Stop 6/7/17 at 11:59


Ringer's Solution 1,000 ml @  0 mls/hr Q0M IV  Last administered on 6/6/17at 12:

26;  Start 6/6/17 at 11:57;  Stop 6/6/17 at 23:56;  Status DC


Lidocaine HCl 2 ml PRN 1X  PRN ID PRIOR TO IV START;  Start 6/6/17 at 12:00;  

Stop 6/7/17 at 11:59


Hydromorphone HCl (Dilaudid) 0.5 mg PRN Q10MIN  PRN IV SEV PAIN, Second choice;

  Start 6/6/17 at 12:00;  Stop 6/7/17 at 11:59


Prochlorperazine Edisylate (Compazine) 5 mg PACU PRN  PRN IV NAUSEA, MRX1;  

Start 6/6/17 at 12:00;  Stop 6/7/17 at 11:59


Fentanyl Citrate (Fentanyl 5ml Vial) 250 mcg STK-MED ONCE .ROUTE ;  Start 6/6/ 17 at 12:03;  Stop 6/6/17 at 12:04;  Status DC


Rocuronium Bromide (Zemuron) 50 mg STK-MED ONCE .ROUTE ;  Start 6/6/17 at 12:03

;  Stop 6/6/17 at 12:04;  Status DC


Dexamethasone Sodium Phosphate (Decadron) 20 mg STK-MED ONCE .ROUTE ;  Start 6/6 /17 at 12:03;  Stop 6/6/17 at 12:04;  Status DC


Ondansetron HCl (Zofran) 4 mg STK-MED ONCE .ROUTE ;  Start 6/6/17 at 12:03;  

Stop 6/6/17 at 12:04;  Status DC


Propofol 20 ml @ As Directed STK-MED ONCE IV ;  Start 6/6/17 at 12:03;  Stop 6/6 /17 at 12:04;  Status DC


Lidocaine HCl (Lidocaine Pf 2% Vial) 5 ml STK-MED ONCE .ROUTE ;  Start 6/6/17 

at 12:03;  Stop 6/6/17 at 12:04;  Status DC


Sevoflurane (Ultane) 60 ml STK-MED ONCE IH ;  Start 6/6/17 at 12:03;  Stop 6/6/ 17 at 12:04;  Status DC


Cellulose 1 each STK-MED ONCE .ROUTE ;  Start 6/6/17 at 12:05;  Stop 6/6/17 at 

12:06;  Status DC


Bupivacaine HCl/ Epinephrine Bitart (Sensorcain-Mpf Epi 0.5%-1:[id number]) 30 ml STK

-MED ONCE .ROUTE  Last administered on 6/6/17at 12:51;  Start 6/6/17 at 12:05;  

Stop 6/6/17 at 12:06;  Status DC


Iohexol (Omnipaque 300 Mg/ml) 50 ml STK-MED ONCE .ROUTE  Last administered on 6/ 6/17at 12:51;  Start 6/6/17 at 12:05;  Stop 6/6/17 at 12:06;  Status DC


Propofol 20 ml @ As Directed STK-MED ONCE IV ;  Start 6/6/17 at 13:32;  Stop 6/6 /17 at 13:33;  Status DC


Neostigmine Methylsulfate (Bloxiverz) 10 mg STK-MED ONCE .ROUTE ;  Start 6/6/17 

at 13:44;  Stop 6/6/17 at 13:45;  Status DC


Glycopyrrolate (Robinul) 1 mg STK-MED ONCE .ROUTE ;  Start 6/6/17 at 13:45;  

Stop 6/6/17 at 13:46;  Status DC


Neostigmine Methylsulfate 5 mg STK-MED ONCE .ROUTE ;  Start 6/6/17 at 13:45;  

Stop 6/6/17 at 13:46;  Status DC


Oxycodone/ Acetaminophen (Percocet 5/325) 1 tab PRN Q4HRS  PRN PO PAIN Last 

administered on 6/6/17at 19:43;  Start 6/6/17 at 14:00


Oxycodone/ Acetaminophen (Percocet 5/325) 2 tab PRN Q4HRS  PRN PO PAIN Last 

administered on 6/7/17at 07:39;  Start 6/6/17 at 14:00


Levofloxacin/ Dextrose 100 ml @  100 mls/hr 1X  ONCE IV ;  Start 6/8/17 at 09:00

;  Stop 6/8/17 at 09:59


Vitals/I & O





Vital Sign - Last 24 Hours








 6/6/17 6/6/17 6/6/17 6/6/17





 12:04 13:57 14:00 14:12


 


Temp 97.7 98.7  98.7





 97.7 98.7  98.7


 


Pulse 80 82  81


 


Resp 20 19  18


 


B/P (MAP) 125/87 142/72  139/73


 


Pulse Ox 96 100  100


 


O2 Delivery Room Air Simple Mask Mask Simple Mask


 


O2 Flow Rate  8 8 8


 


    





    





 6/6/17 6/6/17 6/6/17 6/6/17





 14:27 14:41 14:42 14:57


 


Temp 98.7  98.7 98.4





 98.7  98.7 98.4


 


Pulse 77  72 72


 


Resp 20 17 17 17


 


B/P (MAP) 135/80  142/72 134/73


 


Pulse Ox 100 95 95 94


 


O2 Delivery Simple Mask Room Air Room Air Room Air


 


O2 Flow Rate 8   


 


    





    





 6/6/17 6/6/17 6/6/17 6/6/17





 15:45 15:52 16:46 17:44


 


Temp 97.5 97.9 98.6 98.6





 97.5 97.9 98.6 98.6


 


Pulse 81 87 83 116


 


Resp 20 20 20 20


 


B/P (MAP) 123/50 (74) 121/71 (88) 130/84 (99) 119/82 (94)


 


Pulse Ox 97 93 94 91


 


O2 Delivery Room Air Room Air Room Air Room Air


 


    





    





 6/6/17 6/6/17 6/6/17 6/6/17





 19:00 19:43 19:49 21:15


 


Temp 98.6   





 98.6   


 


Pulse 90   


 


Resp 20   


 


B/P (MAP) 112/68 (83)   


 


Pulse Ox 94   


 


O2 Delivery Room Air Room Air Room Air Room Air


 


    





    





 6/6/17 6/7/17 6/7/17 





 23:00 03:00 07:00 


 


Temp 98.5 97.6 97.7 





 98.5 97.6 97.7 


 


Pulse 120 108 97 


 


Resp 18 18 14 


 


B/P (MAP) 110/73 (85) 116/75 (89) 112/75 (87) 


 


Pulse Ox 95 97 94 


 


O2 Delivery Room Air Room Air Room Air 














Intake and Output   


 


 6/6/17 6/6/17 6/7/17





 15:00 23:00 07:00


 


Intake Total   0 ml


 


Output Total 120 ml 600 ml 1500 ml


 


Balance -120 ml -600 ml -1500 ml

















ELVIS PORTILLO MD Jun 7, 2017 11:26

## 2017-06-07 NOTE — PDOC
PROGRESS NOTES


Chief Complaint


Chief Complaint


Cholecystitis








ASSESSMENT AND PLAN:


1.  Cholecystitis:  s/p lap CCY on 6/6 by Dr Boyle.  recovering appropriately

, although persistent ain.  plan for ERCP for suspected stone in CBD


2.  Nausea:  improved, tolerating PO diet.  antiemetics PRN


3.  ?GERD:  start PPI


4.  Prophylaxis:  hold lovenox with anticipated procedure in AM.  SCDs





History of Present Illness


History of Present Illness


pain controlled to some degree.  had sensation of food stuck under xiphoid 

after eating.





Vitals


Vitals





Vital Signs








  Date Time  Temp Pulse Resp B/P (MAP) Pulse Ox O2 Delivery O2 Flow Rate FiO2


 


6/7/17 11:00 98.3 87 14 106/62 (77) 95 Room Air  





 98.3       


 


6/6/17 14:27       8 











Physical Exam


General:  Alert, Oriented X3, Cooperative, No acute distress


Heart:  Regular rate, Normal S1, Normal S2, No murmurs


Lungs:  Clear


Abdomen:  Soft, Other (lap incisions covered with gauze bandaids, no blood. 

mild generalized TTP)


Extremities:  No clubbing, No cyanosis


Skin:  No rashes





Labs


LABS





Laboratory Tests








Test


  6/7/17


04:55 6/7/17


11:50


 


Total Bilirubin


  0.6 mg/dL


(0.2-1.0) 


 


 


Direct Bilirubin


  0.2 mg/dL


(0.0-0.2) 


 


 


Aspartate Amino Transf


(AST/SGOT) 30 U/L (15-37) 


  


 


 


Alanine Aminotransferase


(ALT/SGPT) 41 U/L (14-59) 


  


 


 


Alkaline Phosphatase


  51 U/L


() 


 


 


Total Protein


  6.7 g/dL


(6.4-8.2) 


 


 


Albumin


  3.1 g/dL


(3.4-5.0) 


 


 


Prothrombin Time


  


  13.2 SEC


(11.7-14.0)


 


Prothromb Time International


Ratio 


  1.1 (0.8-1.1) 


 

















SILVIA WONG MD Jun 7, 2017 17:01

## 2017-06-08 VITALS — DIASTOLIC BLOOD PRESSURE: 70 MMHG | SYSTOLIC BLOOD PRESSURE: 103 MMHG

## 2017-06-08 VITALS — DIASTOLIC BLOOD PRESSURE: 72 MMHG | SYSTOLIC BLOOD PRESSURE: 116 MMHG

## 2017-06-08 VITALS — DIASTOLIC BLOOD PRESSURE: 74 MMHG | SYSTOLIC BLOOD PRESSURE: 125 MMHG

## 2017-06-08 VITALS — DIASTOLIC BLOOD PRESSURE: 72 MMHG | SYSTOLIC BLOOD PRESSURE: 108 MMHG

## 2017-06-08 VITALS — DIASTOLIC BLOOD PRESSURE: 79 MMHG | SYSTOLIC BLOOD PRESSURE: 126 MMHG

## 2017-06-08 VITALS — SYSTOLIC BLOOD PRESSURE: 130 MMHG | DIASTOLIC BLOOD PRESSURE: 85 MMHG

## 2017-06-08 VITALS — SYSTOLIC BLOOD PRESSURE: 96 MMHG | DIASTOLIC BLOOD PRESSURE: 60 MMHG

## 2017-06-08 VITALS — DIASTOLIC BLOOD PRESSURE: 81 MMHG | SYSTOLIC BLOOD PRESSURE: 124 MMHG

## 2017-06-08 VITALS — SYSTOLIC BLOOD PRESSURE: 113 MMHG | DIASTOLIC BLOOD PRESSURE: 78 MMHG

## 2017-06-08 VITALS — DIASTOLIC BLOOD PRESSURE: 65 MMHG | SYSTOLIC BLOOD PRESSURE: 99 MMHG

## 2017-06-08 LAB
ALBUMIN SERPL-MCNC: 3 G/DL (ref 3.4–5)
ALBUMIN/GLOB SERPL: 0.9 {RATIO} (ref 1–1.7)
ALP SERPL-CCNC: 54 U/L (ref 46–116)
ALT SERPL-CCNC: 186 U/L (ref 14–59)
ANION GAP SERPL CALC-SCNC: 5 MMOL/L (ref 6–14)
AST SERPL-CCNC: 183 U/L (ref 15–37)
BASOPHILS # BLD AUTO: 0 X10^3/UL (ref 0–0.2)
BASOPHILS NFR BLD: 0 % (ref 0–3)
BILIRUB SERPL-MCNC: 0.3 MG/DL (ref 0.2–1)
BUN SERPL-MCNC: 10 MG/DL (ref 7–20)
BUN/CREAT SERPL: 14 (ref 6–20)
CALCIUM SERPL-MCNC: 8.1 MG/DL (ref 8.5–10.1)
CHLORIDE SERPL-SCNC: 109 MMOL/L (ref 98–107)
CO2 SERPL-SCNC: 30 MMOL/L (ref 21–32)
CREAT SERPL-MCNC: 0.7 MG/DL (ref 0.6–1)
EOSINOPHIL NFR BLD: 1 % (ref 0–3)
ERYTHROCYTE [DISTWIDTH] IN BLOOD BY AUTOMATED COUNT: 13.1 % (ref 11.5–14.5)
GFR SERPLBLD BASED ON 1.73 SQ M-ARVRAT: 102.8 ML/MIN
GLOBULIN SER-MCNC: 3.3 G/DL (ref 2.2–3.8)
GLUCOSE SERPL-MCNC: 79 MG/DL (ref 70–99)
HCT VFR BLD CALC: 32.6 % (ref 36–47)
HGB BLD-MCNC: 11.3 G/DL (ref 12–15.5)
LYMPHOCYTES # BLD: 2.6 X10^3/UL (ref 1–4.8)
LYMPHOCYTES NFR BLD AUTO: 37 % (ref 24–48)
MCH RBC QN AUTO: 30 PG (ref 25–35)
MCHC RBC AUTO-ENTMCNC: 35 G/DL (ref 31–37)
MCV RBC AUTO: 85 FL (ref 79–100)
MONOCYTES NFR BLD: 11 % (ref 0–9)
NEUTROPHILS NFR BLD AUTO: 50 % (ref 31–73)
PLATELET # BLD AUTO: 148 X10^3/UL (ref 140–400)
POTASSIUM SERPL-SCNC: 3.8 MMOL/L (ref 3.5–5.1)
PROT SERPL-MCNC: 6.3 G/DL (ref 6.4–8.2)
RBC # BLD AUTO: 3.82 X10^6/UL (ref 3.5–5.4)
SODIUM SERPL-SCNC: 144 MMOL/L (ref 136–145)
WBC # BLD AUTO: 7 X10^3/UL (ref 4–11)

## 2017-06-08 RX ADMIN — PANTOPRAZOLE SODIUM SCH MG: 40 TABLET, DELAYED RELEASE ORAL at 07:30

## 2017-06-08 RX ADMIN — BACITRACIN SCH MLS/HR: 5000 INJECTION, POWDER, FOR SOLUTION INTRAMUSCULAR at 00:49

## 2017-06-08 RX ADMIN — OXYCODONE HYDROCHLORIDE AND ACETAMINOPHEN PRN TAB: 5; 325 TABLET ORAL at 17:28

## 2017-06-08 RX ADMIN — SODIUM CHLORIDE, SODIUM LACTATE, POTASSIUM CHLORIDE, AND CALCIUM CHLORIDE SCH MLS/HR: .6; .31; .03; .02 INJECTION, SOLUTION INTRAVENOUS at 12:38

## 2017-06-08 RX ADMIN — MORPHINE SULFATE PRN MG: 2 INJECTION, SOLUTION INTRAMUSCULAR; INTRAVENOUS at 13:55

## 2017-06-08 RX ADMIN — OXYCODONE HYDROCHLORIDE AND ACETAMINOPHEN PRN TAB: 5; 325 TABLET ORAL at 22:14

## 2017-06-08 RX ADMIN — BACITRACIN SCH MLS/HR: 5000 INJECTION, POWDER, FOR SOLUTION INTRAMUSCULAR at 10:59

## 2017-06-08 RX ADMIN — SODIUM CHLORIDE, SODIUM LACTATE, POTASSIUM CHLORIDE, AND CALCIUM CHLORIDE SCH MLS/HR: .6; .31; .03; .02 INJECTION, SOLUTION INTRAVENOUS at 07:00

## 2017-06-08 NOTE — PDOC4
PROCEDURE


Procedure


ERCP/ES/Balloon sweep





Ind: abnormal IOC





Meds: GET per anesthesia





Findings:  E not well seen.  G, D normal w/i limits of scope.  Minor papilla 

noted and normal.





Major papilla: perhaps mildly traumatized.





CBD: opacified to bifurcation.  No apparent cystic duct leak.  No definite 

stone seen.  ES done and balloon sweep x 3.  No stone seen to exit (though 

could have popped downstream).





PD not attempted.





Kelechi. well.





IMP: choledocholithiasis, resolved either with us or spontaneously.





REC: OK to feed, dismiss at your discretion.


         No ASA, NSAIDs for 2 weeks.


         F/u with me prn.





Thanks.











LUIS ARMANDO HERRON MD Jun 8, 2017 13:35

## 2017-06-08 NOTE — PATHOLOGY
PATHOLOGY REPORT 

 

*    *    *    *    *    *    *    * 

 FINAL DIAGNOSIS:

Gallbladder, laparoscopic cholecystectomy:

- Cholelithiasis.

- Cholesterolosis.

- Acute and chronic cholecystitis with eosinophils.

 

COMMENT:

There is no evidence of malignancy.

 

 

REPORT ELECTRONICALLY SIGNED BY:   Jose G Barnett M.D.

DATE/TIME:   2017 13:37

*    *    *    *    *    *    *    *

 

GROSS PATHOLOGY:

Received in formalin labeled "Stephani Marina, gallbladder and

contents," is an 8.8 x 3.8 x 3.4 cm, intact gallbladder with pink-tan

serosal surfaces.  Opening the gallbladder reveals a velvety,

pink-tan mucosa with moderate, diffuse cholesterolosis and an average

wall thickness of 0.1 cm.  Calculi are present displaying a

yellow-tan and nodular to multifaceted appearance, and no masses are

noted grossly.  Representative sections from the body and fundus are

submitted along with the proximal margin in cassette A1.

([facility]; 2017)

 

 

 INITIAL CPT CODE(S):

A; 73351

Professional services performed by [facility] [place] 

[place] [address]

 

  Technical services performed by [facility] at [address] [place] [address]

[address] [address].

  

 SPECIMEN(S) RECEIVED:

A.Gallbladder and contents

 

 CLINICAL HISTORY:

Acute cholecystitis 

 

 PATIENT:  STEPHANI MARINA

/AGE:  1990 (Age: 26)  

PATIENT #:  [id number]

ACCESSION #:  [id number]          ALT CASE #:   

SPECIMEN COLLECTION DATE:  2017

SPECIMEN RECEIVED DATE:  2017

   

[facility] - [address] [place] [address] - PHONE: 

[phone]

* * *  END OF REPORT  * * *

## 2017-06-08 NOTE — PDOC
SURGICAL PROGRESS NOTE


Subjective


anxious for procedure


pain managed


tolerated diet yesterday


Vital Signs





Vital Signs








  Date Time  Temp Pulse Resp B/P (MAP) Pulse Ox O2 Delivery O2 Flow Rate FiO2


 


6/8/17 11:00 97.7 76 20 108/72 (84) 96 Room Air  





 97.7       


 


6/7/17 21:13       8.0 








I&O











Intake and Output 


 


 6/8/17





 06:59


 


Intake Total 600 ml


 


Output Total 800 ml


 


Balance -200 ml


 


 


 


IV Total 600 ml


 


Output Urine Total 800 ml


 


# Voids 4








General:  Alert, Oriented X3, Cooperative, No acute distress


Abdomen:  Soft, Other (incisional TTP)


Labs





Laboratory Tests








Test


  6/7/17


04:55 6/7/17


11:50 6/8/17


08:35


 


Total Bilirubin


  0.6 mg/dL


(0.2-1.0) 


  0.3 mg/dL


(0.2-1.0)


 


Direct Bilirubin


  0.2 mg/dL


(0.0-0.2) 


  


 


 


Aspartate Amino Transf


(AST/SGOT) 30 U/L (15-37) 


  


  183 U/L


(15-37)


 


Alanine Aminotransferase


(ALT/SGPT) 41 U/L (14-59) 


  


  186 U/L


(14-59)


 


Alkaline Phosphatase


  51 U/L


() 


  54 U/L


()


 


Total Protein


  6.7 g/dL


(6.4-8.2) 


  6.3 g/dL


(6.4-8.2)


 


Albumin


  3.1 g/dL


(3.4-5.0) 


  3.0 g/dL


(3.4-5.0)


 


Prothrombin Time


  


  13.2 SEC


(11.7-14.0) 


 


 


Prothromb Time International


Ratio 


  1.1 (0.8-1.1) 


  


 


 


White Blood Count


  


  


  7.0 x10^3/uL


(4.0-11.0)


 


Red Blood Count


  


  


  3.82 x10^6/uL


(3.50-5.40)


 


Hemoglobin


  


  


  11.3 g/dL


(12.0-15.5)


 


Hematocrit


  


  


  32.6 %


(36.0-47.0)


 


Mean Corpuscular Volume   85 fL () 


 


Mean Corpuscular Hemoglobin   30 pg (25-35) 


 


Mean Corpuscular Hemoglobin


Concent 


  


  35 g/dL


(31-37)


 


Red Cell Distribution Width


  


  


  13.1 %


(11.5-14.5)


 


Platelet Count


  


  


  148 x10^3/uL


(140-400)


 


Neutrophils (%) (Auto)   50 % (31-73) 


 


Lymphocytes (%) (Auto)   37 % (24-48) 


 


Monocytes (%) (Auto)   11 % (0-9) 


 


Eosinophils (%) (Auto)   1 % (0-3) 


 


Basophils (%) (Auto)   0 % (0-3) 


 


Neutrophils # (Auto)


  


  


  3.5 x10^3uL


(1.8-7.7)


 


Lymphocytes # (Auto)


  


  


  2.6 x10^3/uL


(1.0-4.8)


 


Monocytes # (Auto)


  


  


  0.8 x10^3/uL


(0.0-1.1)


 


Eosinophils # (Auto)


  


  


  0.1 x10^3/uL


(0.0-0.7)


 


Basophils # (Auto)


  


  


  0.0 x10^3/uL


(0.0-0.2)


 


Sodium Level


  


  


  144 mmol/L


(136-145)


 


Potassium Level


  


  


  3.8 mmol/L


(3.5-5.1)


 


Chloride Level


  


  


  109 mmol/L


()


 


Carbon Dioxide Level


  


  


  30 mmol/L


(21-32)


 


Anion Gap   5 (6-14) 


 


Blood Urea Nitrogen


  


  


  10 mg/dL


(7-20)


 


Creatinine


  


  


  0.7 mg/dL


(0.6-1.0)


 


Estimated GFR


(Cockcroft-Gault) 


  


  102.8 


 


 


BUN/Creatinine Ratio   14 (6-20) 


 


Glucose Level


  


  


  79 mg/dL


(70-99)


 


Calcium Level


  


  


  8.1 mg/dL


(8.5-10.1)


 


Albumin/Globulin Ratio   0.9 (1.0-1.7) 








Laboratory Tests








Test


  6/7/17


11:50 6/8/17


08:35


 


Prothrombin Time


  13.2 SEC


(11.7-14.0) 


 


 


Prothromb Time International


Ratio 1.1 (0.8-1.1) 


  


 


 


White Blood Count


  


  7.0 x10^3/uL


(4.0-11.0)


 


Red Blood Count


  


  3.82 x10^6/uL


(3.50-5.40)


 


Hemoglobin


  


  11.3 g/dL


(12.0-15.5)


 


Hematocrit


  


  32.6 %


(36.0-47.0)


 


Mean Corpuscular Volume  85 fL () 


 


Mean Corpuscular Hemoglobin  30 pg (25-35) 


 


Mean Corpuscular Hemoglobin


Concent 


  35 g/dL


(31-37)


 


Red Cell Distribution Width


  


  13.1 %


(11.5-14.5)


 


Platelet Count


  


  148 x10^3/uL


(140-400)


 


Neutrophils (%) (Auto)  50 % (31-73) 


 


Lymphocytes (%) (Auto)  37 % (24-48) 


 


Monocytes (%) (Auto)  11 % (0-9) 


 


Eosinophils (%) (Auto)  1 % (0-3) 


 


Basophils (%) (Auto)  0 % (0-3) 


 


Neutrophils # (Auto)


  


  3.5 x10^3uL


(1.8-7.7)


 


Lymphocytes # (Auto)


  


  2.6 x10^3/uL


(1.0-4.8)


 


Monocytes # (Auto)


  


  0.8 x10^3/uL


(0.0-1.1)


 


Eosinophils # (Auto)


  


  0.1 x10^3/uL


(0.0-0.7)


 


Basophils # (Auto)


  


  0.0 x10^3/uL


(0.0-0.2)


 


Sodium Level


  


  144 mmol/L


(136-145)


 


Potassium Level


  


  3.8 mmol/L


(3.5-5.1)


 


Chloride Level


  


  109 mmol/L


()


 


Carbon Dioxide Level


  


  30 mmol/L


(21-32)


 


Anion Gap  5 (6-14) 


 


Blood Urea Nitrogen


  


  10 mg/dL


(7-20)


 


Creatinine


  


  0.7 mg/dL


(0.6-1.0)


 


Estimated GFR


(Cockcroft-Gault) 


  102.8 


 


 


BUN/Creatinine Ratio  14 (6-20) 


 


Glucose Level


  


  79 mg/dL


(70-99)


 


Calcium Level


  


  8.1 mg/dL


(8.5-10.1)


 


Total Bilirubin


  


  0.3 mg/dL


(0.2-1.0)


 


Aspartate Amino Transf


(AST/SGOT) 


  183 U/L


(15-37)


 


Alanine Aminotransferase


(ALT/SGPT) 


  186 U/L


(14-59)


 


Alkaline Phosphatase


  


  54 U/L


()


 


Total Protein


  


  6.3 g/dL


(6.4-8.2)


 


Albumin


  


  3.0 g/dL


(3.4-5.0)


 


Albumin/Globulin Ratio  0.9 (1.0-1.7) 








Problem List


s/p lap ritu


ERCP today


Problems:  











LENORE CONLEY APRN Jun 8, 2017 11:38

## 2017-06-09 VITALS — SYSTOLIC BLOOD PRESSURE: 115 MMHG | DIASTOLIC BLOOD PRESSURE: 77 MMHG

## 2017-06-09 VITALS — DIASTOLIC BLOOD PRESSURE: 65 MMHG | SYSTOLIC BLOOD PRESSURE: 108 MMHG

## 2017-06-09 RX ADMIN — OXYCODONE HYDROCHLORIDE AND ACETAMINOPHEN PRN TAB: 5; 325 TABLET ORAL at 10:00

## 2017-06-09 RX ADMIN — PANTOPRAZOLE SODIUM SCH MG: 40 TABLET, DELAYED RELEASE ORAL at 08:27

## 2017-06-09 NOTE — PDOC
SURGICAL PROGRESS NOTE


Subjective


tolerating clears


no n/v


pain managed


ready to go home


Vital Signs





Vital Signs








  Date Time  Temp Pulse Resp B/P (MAP) Pulse Ox O2 Delivery O2 Flow Rate FiO2


 


6/9/17 07:00 98.1 73 20 115/77 (90) 97 Room Air  





 98.1       


 


6/8/17 18:46       8.0 








I&O











Intake and Output 


 


 6/9/17





 07:00


 


Intake Total 290 ml


 


Output Total 1310 ml


 


Balance -1020 ml


 


 


 


Intake Oral 290 ml


 


Output Urine Total 1310 ml


 


# Voids 5








General:  Alert, Oriented X3, Cooperative, No acute distress


Abdomen:  Soft, No tenderness, Other (ND, lap sites c/d/i, no erythema )


Labs





Laboratory Tests








Test


  6/7/17


11:50 6/8/17


08:35


 


Prothrombin Time


  13.2 SEC


(11.7-14.0) 


 


 


Prothromb Time International


Ratio 1.1 (0.8-1.1) 


  


 


 


White Blood Count


  


  7.0 x10^3/uL


(4.0-11.0)


 


Red Blood Count


  


  3.82 x10^6/uL


(3.50-5.40)


 


Hemoglobin


  


  11.3 g/dL


(12.0-15.5)


 


Hematocrit


  


  32.6 %


(36.0-47.0)


 


Mean Corpuscular Volume  85 fL () 


 


Mean Corpuscular Hemoglobin  30 pg (25-35) 


 


Mean Corpuscular Hemoglobin


Concent 


  35 g/dL


(31-37)


 


Red Cell Distribution Width


  


  13.1 %


(11.5-14.5)


 


Platelet Count


  


  148 x10^3/uL


(140-400)


 


Neutrophils (%) (Auto)  50 % (31-73) 


 


Lymphocytes (%) (Auto)  37 % (24-48) 


 


Monocytes (%) (Auto)  11 % (0-9) 


 


Eosinophils (%) (Auto)  1 % (0-3) 


 


Basophils (%) (Auto)  0 % (0-3) 


 


Neutrophils # (Auto)


  


  3.5 x10^3uL


(1.8-7.7)


 


Lymphocytes # (Auto)


  


  2.6 x10^3/uL


(1.0-4.8)


 


Monocytes # (Auto)


  


  0.8 x10^3/uL


(0.0-1.1)


 


Eosinophils # (Auto)


  


  0.1 x10^3/uL


(0.0-0.7)


 


Basophils # (Auto)


  


  0.0 x10^3/uL


(0.0-0.2)


 


Sodium Level


  


  144 mmol/L


(136-145)


 


Potassium Level


  


  3.8 mmol/L


(3.5-5.1)


 


Chloride Level


  


  109 mmol/L


()


 


Carbon Dioxide Level


  


  30 mmol/L


(21-32)


 


Anion Gap  5 (6-14) 


 


Blood Urea Nitrogen


  


  10 mg/dL


(7-20)


 


Creatinine


  


  0.7 mg/dL


(0.6-1.0)


 


Estimated GFR


(Cockcroft-Gault) 


  102.8 


 


 


BUN/Creatinine Ratio  14 (6-20) 


 


Glucose Level


  


  79 mg/dL


(70-99)


 


Calcium Level


  


  8.1 mg/dL


(8.5-10.1)


 


Total Bilirubin


  


  0.3 mg/dL


(0.2-1.0)


 


Aspartate Amino Transf


(AST/SGOT) 


  183 U/L


(15-37)


 


Alanine Aminotransferase


(ALT/SGPT) 


  186 U/L


(14-59)


 


Alkaline Phosphatase


  


  54 U/L


()


 


Total Protein


  


  6.3 g/dL


(6.4-8.2)


 


Albumin


  


  3.0 g/dL


(3.4-5.0)


 


Albumin/Globulin Ratio  0.9 (1.0-1.7) 








Problem List


s/p lap ritu


ERCP, reviewed note


ok to dc


FU 2 weeks


Problems:  











LENORE CONLEY Jun 9, 2017 09:46

## 2017-06-09 NOTE — PDOC
Subjective:


Subjective:


Seen prior to DC.


No pain, "stomach gurgling," "tired."


No n/v.


Tolerating clears, says going to [employer] for lunch.





Objective:


Vital Signs:





 Vital Signs








  Date Time  Temp Pulse Resp B/P (MAP) Pulse Ox O2 Delivery O2 Flow Rate FiO2


 


6/9/17 11:42      Room Air  


 


6/9/17 07:00 98.1 73 20 115/77 (90) 97   





 98.1       


 


6/8/17 18:46       8.0 








Imaging:


ERCP 6/8/17


Findings:  E not well seen.  G, D normal w/i limits of scope.  Minor papilla 

noted and normal.


Major papilla: perhaps mildly traumatized.


CBD: opacified to bifurcation.  No apparent cystic duct leak.  No definite 

stone seen.  ES done and balloon sweep x 3.  No stone seen to exit (though 

could have popped downstream).


PD not attempted.


Kelechi. well.


IMP: choledocholithiasis, resolved either with us or spontaneously.





PE:





GEN: NAD, dressed to leave


ABD: non-tender


NEURO/PSYCH: A & O 3





A/P:


S/p cholecystectomy for cholelithiasis, cholecystitis


Abnormal IOC


S/p ERCP w/ ES/balloon sweep





--


DC okay per GI.  Follow-up PRN.











SARA MORALES Jun 9, 2017 12:25

## 2017-06-10 NOTE — DS
DATE OF DISCHARGE:  06/09/2017



CHIEF COMPLAINT:  Cholecystitis.



HOSPITAL COURSE:  The patient is a 26-year-old morbidly obese woman who

presented with abdominal pain in the right upper quadrant.  CT was consistent

with findings of cholecystitis and she was taken to the OR for laparoscopic

cholecystectomy on 06/06/2017, by Dr. Valdes.  She recovered appropriately,

although pain seems somewhat ____ suspicion was for persisting stone in common

bile duct.  She therefore underwent ERCP the following day.  No stone could be

visualized at that time.  The patient recovered fairly well, required some pain

medications, but did not have any major issues.  She was ready for discharge on

06/09/2017.



PHYSICAL EXAMINATION:

VITAL SIGNS:  Blood pressure of 113/78, heart rate of 67, respiratory rate at

18.  She is afebrile.

GENERAL:  This is a morbidly obese 26-year-old  woman, alert and

oriented, very pleasant, in no acute distress.

HEENT:  Shows no scleral icterus.

LUNGS:  Clear.

HEART:  Regular rate and rhythm.

ABDOMEN:  Obese, positive bowel sounds.  Laparoscopic incision is covered with

clean gauze Band-Aids.

EXTREMITIES:  Show no edema.



DISCHARGE DATE:  06/09/2017



DISCHARGE DISPOSITION:  To home.



DISCHARGE CONDITION:  Improved.



DISCHARGE DIAGNOSES:  Cholecystitis, status post cholecystectomy on 06/06/2017.



DISCHARGE MEDICATIONS:  Please refer to [facility].



DISCHARGE INSTRUCTIONS:  The patient will follow up with the surgical clinic in

10-14 days.

 



______________________________

SILVIA WONG MD



DR:  UR/nts  JOB#:  [id number] / [id number]

DD:  06/09/2017 22:29  DT:  06/10/2017 05:00

## 2018-06-16 ENCOUNTER — HOSPITAL ENCOUNTER (EMERGENCY)
Dept: HOSPITAL 63 - ER | Age: 28
Discharge: HOME | End: 2018-06-16
Payer: SELF-PAY

## 2018-06-16 VITALS — BODY MASS INDEX: 41.77 KG/M2 | HEIGHT: 66 IN | WEIGHT: 259.93 LBS

## 2018-06-16 VITALS — SYSTOLIC BLOOD PRESSURE: 129 MMHG | DIASTOLIC BLOOD PRESSURE: 75 MMHG

## 2018-06-16 DIAGNOSIS — K08.89: Primary | ICD-10-CM

## 2018-06-16 PROCEDURE — 99283 EMERGENCY DEPT VISIT LOW MDM: CPT

## 2018-06-16 RX ADMIN — CEPHALEXIN ONE MG: 250 CAPSULE ORAL at 22:22

## 2018-06-16 RX ADMIN — HYDROCODONE BITARTRATE AND IBUPROFEN ONE TAB: 7.5; 2 TABLET ORAL at 22:22

## 2018-06-16 NOTE — ED.ADGEN
Past History


Past Medical History:  No Pertinent History


Past Surgical History:  Tonsillectomy


Alcohol Use:  Rarely


Drug Use:  None





Adult General


Chief Complaint


Chief Complaint


".. The filling came out of my rear tooth on the Lt. .. and it is really 

hurting tonight.."





HPI


HPI





Patient is a 27 year old female who presents with above hx and complaints of 

dental pain. She is missing dental fillings in tooth 17.  No pointing 

abscesses. No adenopathy. No trismus. Patient is normally healthy. No history 

immunosuppression. Patient rates her pain 10 out of 10 if exposed to food or 

sweets.





Review of Systems


Review of Systems





Constitutional: Denies fever or chills []


Eyes: Denies change in visual acuity, redness, or eye pain []


HENT: Denies nasal congestion or sore throat []complaints of dental pain


Respiratory: Denies cough or shortness of breath []


Cardiovascular: No additional information not addressed in HPI []


GI: Denies abdominal pain, nausea, vomiting, bloody stools or diarrhea []


: Denies dysuria or hematuria []


Musculoskeletal: Denies back pain or joint pain []


Integument: Denies rash or skin lesions []


Neurologic: Denies headache, focal weakness or sensory changes []


Endocrine: Denies polyuria or polydipsia []





All other systems were reviewed and found to be within normal limits, except as 

documented in this note.





Family History


Family History


Noncontributory





Current Medications


Current Medications





Current Medications








 Medications


  (Trade)  Dose


 Ordered  Sig/James  Start Time


 Stop Time Status Last Admin


Dose Admin


 


 Cephalexin HCl


  (Keflex)  500 mg  1X  ONCE  6/16/18 22:15


 6/16/18 22:35 DC 6/16/18 22:22


500 MG


 


 Hydrocodone


 Bitartrate/


 Ibuprofen


  (Vicoprofen


 7.5-200)  1 tab  1X  ONCE  6/16/18 22:15


 6/16/18 22:35 DC 6/16/18 22:22


1 TAB











Allergies


Allergies





Allergies








Coded Allergies Type Severity Reaction Last Updated Verified


 


  No Known Drug Allergies    6/16/18 No











Physical Exam


Physical Exam





Constitutional: Well developed, well nourished, moderately acute distress, non-

toxic appearance. []


HENT: Normocephalic, atraumatic, bilateral external ears normal, oropharynx 

moist, no oral exudates, nose normal. []Dental pain- Tooth 17


Eyes: PERRLA, EOMI, conjunctiva normal, no discharge. [] 


Neck: Normal range of motion, no tenderness, supple, no stridor. [] 


Cardiovascular:Heart rate regular rhythm, no murmur []


Lungs & Thorax:  Bilateral breath sounds clear to auscultation []


Abdomen: Bowel sounds normal, soft, no tenderness, no masses, no pulsatile 

masses. [] Obese. 


Skin: Warm, dry, no erythema, no rash. [] 


Back: No tenderness, no CVA tenderness. [] 


Extremities: No tenderness, no cyanosis, no clubbing, ROM intact, no edema. [] 


Neurologic: Alert and oriented X 3, normal motor function, normal sensory 

function, no focal deficits noted. []


Psychologic: Affect anxious, judgement normal, mood normal. []





Current Patient Data


Vital Signs





 Vital Signs








  Date Time  Temp Pulse Resp B/P (MAP) Pulse Ox O2 Delivery O2 Flow Rate FiO2


 


6/16/18 21:45 98.1 82 20  97 Room Air  











EKG


EKG


[]





Radiology/Procedures


Radiology/Procedures


[]





Course & Med Decision Making


Course & Med Decision Making


Pertinent Labs and Imaging studies reviewed. (See chart for details)





Must follow-up with Bryce. Take Keflex 500 mg 3 times a day. Take Tylenol and 

ibuprofen for pain. For marked pain may take Vicoprofen up to 4 times a day. 

Must follow-up with dentist. Further narcotics will not be filled emergency 

room for this problem.





[]





Final Impression


Final Impression


1. Dental Pain[]- Tooth 17





Dragon Disclaimer


Dragon Disclaimer


This electronic medical record was generated, in whole or in part, using a 

voice recognition dictation system.











JULITA ZELAYA MD Jun 16, 2018 21:48